# Patient Record
Sex: FEMALE | Race: WHITE | NOT HISPANIC OR LATINO | URBAN - METROPOLITAN AREA
[De-identification: names, ages, dates, MRNs, and addresses within clinical notes are randomized per-mention and may not be internally consistent; named-entity substitution may affect disease eponyms.]

---

## 2023-11-09 ENCOUNTER — OFFICE (OUTPATIENT)
Dept: URBAN - METROPOLITAN AREA CLINIC 8 | Facility: CLINIC | Age: 58
End: 2023-11-09

## 2023-11-09 VITALS
DIASTOLIC BLOOD PRESSURE: 82 MMHG | SYSTOLIC BLOOD PRESSURE: 121 MMHG | HEIGHT: 60 IN | OXYGEN SATURATION: 95 % | HEART RATE: 78 BPM | WEIGHT: 130 LBS

## 2023-11-09 DIAGNOSIS — R14.0 ABDOMINAL DISTENSION (GASEOUS): ICD-10-CM

## 2023-11-09 DIAGNOSIS — R10.13 EPIGASTRIC PAIN: ICD-10-CM

## 2023-11-09 PROCEDURE — 99204 OFFICE O/P NEW MOD 45 MIN: CPT | Performed by: STUDENT IN AN ORGANIZED HEALTH CARE EDUCATION/TRAINING PROGRAM

## 2023-11-09 RX ORDER — FAMOTIDINE 20 MG/1
TABLET, FILM COATED ORAL
Qty: 60 | Refills: 2 | Status: ACTIVE
Start: 2023-11-09

## 2023-11-09 RX ORDER — SODIUM SULFATE, MAGNESIUM SULFATE, AND POTASSIUM CHLORIDE 17.75; 2.7; 2.25 G/1; G/1; G/1
TABLET ORAL
Qty: 24 | Refills: 0 | Status: COMPLETED
Start: 2023-11-09 | End: 2023-12-19

## 2023-11-09 NOTE — SERVICEHPINOTES
Ms. Karlee Preciado is a  58-year-old white female with past medical history of cholecystectomy, hypothyroidism, mood disorder, who presents to gastro 1 as a referral for epigastric pain, abdominal bloating and belching.
br
br clinic visit 11/09/2023: 
br patient reports that she had some issues with her stomach about 13 years ago and she was told that she had gastroparesis and was put on metoclopramide for short period of time.  She reports though her symptoms resolved until about the last 2 weeks she has had worsening abdominal pain in the epigastric area as well as bloating and belching.  She endorses no dysphagia, nausea or vomiting, no blood in the stool or black tarry stool.  She has some chronically loose stools about 2-4 bowel movements per day but that is been going on for her whole entire life.  She had her cholecystectomy in 1992. she recently was seen by her primary care provider who prescribed her Bentyl and she is using it about twice a day with some mild improvement in her symptoms.  She takes Advil about 1 time per month.  Uses omeprazole as needed on occasion.  Her last colonoscopy was over 10 years ago as well as EGD.  She originally is from [place] but has been living in [place] before moving to [place] due to her  working on the [place].  She does not drink alcohol, use recreational drugs or tobacco.  She reports a family history of gastroparesis in her sister.  She endorses no unintentional weight loss recently.

## 2023-11-09 NOTE — SERVICENOTES
the patient has several issues today.  First of all for her abdominal epigastric pain  and new onset abdominal bloating and belching will start her on H2 blocker and perform an upper endoscopy with random gastric biopsies to rule out H pylori.  Counseled the patient on avoiding NSAIDs.   Will also do screening colonoscopy at the time of her procedure.  She likely has some element of either bile acid diarrhea or irritable bowel syndrome but we can continue to work this up at her next visit.  She may need a trial of Colestid in the future  But I do not want to start to minimum new medications at the same time.  Will have her return to clinic after her procedure.  I discussed with the patient the risks and benefits of the procedure including bleeding, infection, anesthesia related complications.  Patient agrees proceed with the planned procedure.  All questions addressed.  She also has this questionable history of gastroparesis so if her symptoms are persistent and her EGD is unremarkable and she is not responding to acid suppression that we may consider doing a gastric emptying study.  However she may be moving back to [place] after December so I told her that she can cancel her follow-up appointment if she decides to move.  All questions addressed.  Personally reviewed the patient's outside medical records for her visit today.

## 2023-12-19 ENCOUNTER — AMBULATORY SURGICAL CENTER (OUTPATIENT)
Dept: URBAN - METROPOLITAN AREA SURGERY 3 | Facility: SURGERY | Age: 58
End: 2023-12-19

## 2023-12-19 ENCOUNTER — OFFICE (OUTPATIENT)
Dept: URBAN - METROPOLITAN AREA PATHOLOGY 12 | Facility: PATHOLOGY | Age: 58
End: 2023-12-19

## 2023-12-19 VITALS
HEART RATE: 63 BPM | DIASTOLIC BLOOD PRESSURE: 76 MMHG | SYSTOLIC BLOOD PRESSURE: 134 MMHG | RESPIRATION RATE: 15 BRPM | TEMPERATURE: 98.4 F | RESPIRATION RATE: 16 BRPM | DIASTOLIC BLOOD PRESSURE: 68 MMHG | OXYGEN SATURATION: 91 % | HEART RATE: 70 BPM | DIASTOLIC BLOOD PRESSURE: 82 MMHG | RESPIRATION RATE: 18 BRPM | TEMPERATURE: 98.2 F | DIASTOLIC BLOOD PRESSURE: 68 MMHG | OXYGEN SATURATION: 96 % | DIASTOLIC BLOOD PRESSURE: 76 MMHG | DIASTOLIC BLOOD PRESSURE: 78 MMHG | DIASTOLIC BLOOD PRESSURE: 82 MMHG | OXYGEN SATURATION: 96 % | TEMPERATURE: 98.4 F | SYSTOLIC BLOOD PRESSURE: 134 MMHG | DIASTOLIC BLOOD PRESSURE: 65 MMHG | HEART RATE: 70 BPM | HEIGHT: 60 IN | DIASTOLIC BLOOD PRESSURE: 78 MMHG | HEIGHT: 60 IN | DIASTOLIC BLOOD PRESSURE: 78 MMHG | SYSTOLIC BLOOD PRESSURE: 107 MMHG | SYSTOLIC BLOOD PRESSURE: 112 MMHG | SYSTOLIC BLOOD PRESSURE: 95 MMHG | HEART RATE: 89 BPM | DIASTOLIC BLOOD PRESSURE: 68 MMHG | OXYGEN SATURATION: 91 % | SYSTOLIC BLOOD PRESSURE: 107 MMHG | RESPIRATION RATE: 20 BRPM | SYSTOLIC BLOOD PRESSURE: 134 MMHG | SYSTOLIC BLOOD PRESSURE: 95 MMHG | SYSTOLIC BLOOD PRESSURE: 112 MMHG | SYSTOLIC BLOOD PRESSURE: 100 MMHG | RESPIRATION RATE: 16 BRPM | SYSTOLIC BLOOD PRESSURE: 107 MMHG | RESPIRATION RATE: 20 BRPM | HEART RATE: 63 BPM | DIASTOLIC BLOOD PRESSURE: 82 MMHG | DIASTOLIC BLOOD PRESSURE: 65 MMHG | HEART RATE: 63 BPM | HEART RATE: 89 BPM | RESPIRATION RATE: 15 BRPM | TEMPERATURE: 98.2 F | RESPIRATION RATE: 18 BRPM | HEART RATE: 70 BPM | RESPIRATION RATE: 15 BRPM | OXYGEN SATURATION: 98 % | SYSTOLIC BLOOD PRESSURE: 112 MMHG | HEART RATE: 89 BPM | SYSTOLIC BLOOD PRESSURE: 100 MMHG | OXYGEN SATURATION: 98 % | SYSTOLIC BLOOD PRESSURE: 95 MMHG | OXYGEN SATURATION: 91 % | RESPIRATION RATE: 18 BRPM | DIASTOLIC BLOOD PRESSURE: 76 MMHG | HEIGHT: 60 IN | RESPIRATION RATE: 20 BRPM | TEMPERATURE: 98.2 F | SYSTOLIC BLOOD PRESSURE: 100 MMHG | RESPIRATION RATE: 16 BRPM | OXYGEN SATURATION: 96 % | TEMPERATURE: 98.4 F | DIASTOLIC BLOOD PRESSURE: 65 MMHG | OXYGEN SATURATION: 98 %

## 2023-12-19 DIAGNOSIS — K31.89 OTHER DISEASES OF STOMACH AND DUODENUM: ICD-10-CM

## 2023-12-19 DIAGNOSIS — K29.70 GASTRITIS, UNSPECIFIED, WITHOUT BLEEDING: ICD-10-CM

## 2023-12-19 DIAGNOSIS — R10.13 EPIGASTRIC PAIN: ICD-10-CM

## 2023-12-19 DIAGNOSIS — R14.0 ABDOMINAL DISTENSION (GASEOUS): ICD-10-CM

## 2023-12-19 PROCEDURE — 43239 EGD BIOPSY SINGLE/MULTIPLE: CPT | Performed by: STUDENT IN AN ORGANIZED HEALTH CARE EDUCATION/TRAINING PROGRAM

## 2023-12-19 PROCEDURE — 88305 TISSUE EXAM BY PATHOLOGIST: CPT | Mod: TC | Performed by: STUDENT IN AN ORGANIZED HEALTH CARE EDUCATION/TRAINING PROGRAM

## 2023-12-19 RX ORDER — PANTOPRAZOLE SODIUM 40 MG/1
40 TABLET, DELAYED RELEASE ORAL
Qty: 30 | Refills: 3 | Status: ACTIVE
Start: 2023-12-19

## 2023-12-19 NOTE — SERVICEHPINOTES
Ms. Karlee Preciado is a 58-year-old white female with past medical history of cholecystectomy, hypothyroidism, mood disorder, who initially presented to gastro 1 as a referral for epigastric pain, abdominal bloating and belching.clinic visit 11/09/2023: brpatient reports that she had some issues with her stomach about 13 years ago and she was told that she had gastroparesis and was put on metoclopramide for short period of time.  She reports though her symptoms resolved until about the last 2 weeks she has had worsening abdominal pain in the epigastric area as well as bloating and belching.  She endorses no dysphagia, nausea or vomiting, no blood in the stool or black tarry stool.  She has some chronically loose stools about 2-4 bowel movements per day but that is been going on for her whole entire life.  She had her cholecystectomy in 1992. she recently was seen by her primary care provider who prescribed her Bentyl and she is using it about twice a day with some mild improvement in her symptoms.  She takes Advil about 1 time per month.  Uses omeprazole as needed on occasion.  Her last colonoscopy was over 10 years ago as well as EGD.  She originally is from [place] but has been living in [place] before moving to [place] due to her  working on the [place].  She does not drink alcohol, use recreational drugs or tobacco.  She reports a family history of gastroparesis in her sister.  She endorses no unintentional weight loss recently.
br
br visited="true"  EGD 12/19/23:  
br
Feeling well today, ready for procedure, not on blood thinners. She decided against colonoscopy since she was having abdominal pain earlier this week and chose to do cologard instead which is pending.

## 2023-12-21 LAB
GASTRO ONE PATHOLOGY: PDF REPORT: (no result)